# Patient Record
Sex: FEMALE | Race: WHITE | URBAN - METROPOLITAN AREA
[De-identification: names, ages, dates, MRNs, and addresses within clinical notes are randomized per-mention and may not be internally consistent; named-entity substitution may affect disease eponyms.]

---

## 2019-12-10 ENCOUNTER — APPOINTMENT (OUTPATIENT)
Dept: URBAN - METROPOLITAN AREA CLINIC 219 | Age: 34
Setting detail: DERMATOLOGY
End: 2019-12-18

## 2019-12-10 DIAGNOSIS — D22 MELANOCYTIC NEVI: ICD-10-CM

## 2019-12-10 DIAGNOSIS — L259 CONTACT DERMATITIS AND OTHER ECZEMA, UNSPECIFIED CAUSE: ICD-10-CM

## 2019-12-10 PROBLEM — D22.5 MELANOCYTIC NEVI OF TRUNK: Status: ACTIVE | Noted: 2019-12-10

## 2019-12-10 PROBLEM — L30.9 DERMATITIS, UNSPECIFIED: Status: ACTIVE | Noted: 2019-12-10

## 2019-12-10 PROBLEM — M12.9 ARTHROPATHY, UNSPECIFIED: Status: ACTIVE | Noted: 2019-12-10

## 2019-12-10 PROCEDURE — OTHER OBSERVATION AND MEASURE: OTHER

## 2019-12-10 PROCEDURE — OTHER MEDICATION COUNSELING: OTHER

## 2019-12-10 PROCEDURE — OTHER OBSERVATION: OTHER

## 2019-12-10 PROCEDURE — OTHER PRESCRIPTION: OTHER

## 2019-12-10 PROCEDURE — OTHER MIPS QUALITY: OTHER

## 2019-12-10 PROCEDURE — 99203 OFFICE O/P NEW LOW 30 MIN: CPT

## 2019-12-10 PROCEDURE — OTHER TREATMENT REGIMEN: OTHER

## 2019-12-10 RX ORDER — PERMETHRIN 50 MG/G
APPLY CREAM TOPICAL AS DIRECTED
Qty: 1 | Refills: 1 | Status: ERX | COMMUNITY
Start: 2019-12-10

## 2019-12-10 RX ORDER — TRIAMCINOLONE ACETONIDE 1 MG/G
APPLY CREAM TOPICAL BID
Qty: 1 | Refills: 1 | Status: ERX | COMMUNITY
Start: 2019-12-10

## 2019-12-10 ASSESSMENT — LOCATION DETAILED DESCRIPTION DERM
LOCATION DETAILED: RIGHT ANTERIOR PROXIMAL THIGH
LOCATION DETAILED: LEFT ANTERIOR DISTAL UPPER ARM
LOCATION DETAILED: RIGHT ANTERIOR DISTAL UPPER ARM
LOCATION DETAILED: UPPER STERNUM
LOCATION DETAILED: LEFT VENTRAL DISTAL FOREARM
LOCATION DETAILED: RIGHT ANTERIOR LATERAL PROXIMAL THIGH
LOCATION DETAILED: SUPERIOR THORACIC SPINE
LOCATION DETAILED: LEFT ANTERIOR PROXIMAL THIGH
LOCATION DETAILED: RIGHT VENTRAL DISTAL FOREARM
LOCATION DETAILED: EPIGASTRIC SKIN

## 2019-12-10 ASSESSMENT — LOCATION SIMPLE DESCRIPTION DERM
LOCATION SIMPLE: UPPER BACK
LOCATION SIMPLE: RIGHT UPPER ARM
LOCATION SIMPLE: LEFT THIGH
LOCATION SIMPLE: RIGHT FOREARM
LOCATION SIMPLE: LEFT FOREARM
LOCATION SIMPLE: CHEST
LOCATION SIMPLE: ABDOMEN
LOCATION SIMPLE: RIGHT THIGH
LOCATION SIMPLE: LEFT UPPER ARM

## 2019-12-10 ASSESSMENT — LOCATION ZONE DERM
LOCATION ZONE: LEG
LOCATION ZONE: ARM
LOCATION ZONE: TRUNK

## 2019-12-10 NOTE — PROCEDURE: MEDICATION COUNSELING
Xellonaz Pregnancy And Lactation Text: This medication is Pregnancy Category D and is not considered safe during pregnancy.  The risk during breast feeding is also uncertain.

## 2019-12-10 NOTE — HPI: RASH
What Type Of Note Output Would You Prefer (Optional)?: Standard Output
How Severe Is Your Rash?: moderate
Is This A New Presentation, Or A Follow-Up?: Rash
Additional History: Patient states started the day after thanksgiving on her arms and has spread.  Patient has googled scabies and was concerned but she did not stay in a hotel and states no one else in her family is itching and she does not have close contact with many people.  Patient states has spread all over and she itches a lot but more at night.  Patient does not moisturize regularly but has used aveeno a few times the last few days.  She denies history of previous similar symptoms. Has not tried antihistamines. Has been staying with her father-in-law (who is not itching), and recently saw fleas on his dog and sprayed the house for fleas around the time the rash began. She has also been exposed to a cat recently but does not have any known allergies to cats.

## 2019-12-10 NOTE — PROCEDURE: TREATMENT REGIMEN
Plan: Allegra 180 mg (Fexofenadine) in the morning for itching\\nZyrtec 10 mg in the evening as needed for itching \\nBenadryl 25 mg at bedtime as needed itching (for severe itching - sedation warning)\\nAvoid all fragrances and dyes to the skin \\nDove Unscented bar soap\\nCetaphil cream or Unscented Neutrogena moisturizer 2-3 times a day \\nDiscontinue scratching \\n\\nPermethrin cream apply neck down and between fingers and toes and folds of skin, sleep and wash off in the morning.  Repeat 7 days later \\n\\nTriamcinolone cream twice a day x2 weeks to severe areas of itching \\nSarna lotion for sensitive skin as needed for itching\\n\\nPatient to email Ba with any concerns at [email or web address] (patient will be in [place]) Plan: Allegra 180 mg (Fexofenadine) in the morning for itching\\nZyrtec 10 mg in the evening as needed for itching \\nBenadryl 25 mg at bedtime as needed itching (for severe itching - sedation warning)\\nAvoid all fragrances and dyes to the skin \\nDove Unscented bar soap\\nCetaphil cream or Unscented Neutrogena moisturizer 2-3 times a day \\nDiscontinue scratching \\n\\nPermethrin cream apply neck down and between fingers and toes and folds of skin, sleep and wash off in the morning.  Repeat 7 days later \\n\\nTriamcinolone cream twice a day x2 weeks to severe areas of itching \\nSarna lotion for sensitive skin as needed for itching\\n\\nPatient to email Ba with any concerns at [email or web address] (patient will be in [place])

## 2019-12-10 NOTE — PROCEDURE: OBSERVATION
Size Of Lesion: 6 x 4 mm
Detail Level: Detailed
Body Location Override (Optional - Billing Will Still Be Based On Selected Body Map Location If Applicable): abdominal midline
Size Of Lesion: 6.5 x 5 mm

## 2024-02-10 NOTE — PROCEDURE: MEDICATION COUNSELING
Cardiac Simponi Pregnancy And Lactation Text: The risk during pregnancy and breastfeeding is uncertain with this medication.

## 2024-04-08 NOTE — PROCEDURE: MEDICATION COUNSELING
3 Azithromycin Pregnancy And Lactation Text: This medication is considered safe during pregnancy and is also secreted in breast milk.